# Patient Record
Sex: MALE | Race: WHITE | NOT HISPANIC OR LATINO | Employment: OTHER | URBAN - METROPOLITAN AREA
[De-identification: names, ages, dates, MRNs, and addresses within clinical notes are randomized per-mention and may not be internally consistent; named-entity substitution may affect disease eponyms.]

---

## 2017-12-15 ENCOUNTER — TRANSFERRED RECORDS (OUTPATIENT)
Dept: HEALTH INFORMATION MANAGEMENT | Facility: CLINIC | Age: 57
End: 2017-12-15

## 2018-01-10 DIAGNOSIS — H53.10 SUBJECTIVE VISION DISTURBANCE: Primary | ICD-10-CM

## 2023-06-30 ENCOUNTER — TRANSCRIBE ORDERS (OUTPATIENT)
Dept: OTHER | Age: 63
End: 2023-06-30

## 2023-06-30 DIAGNOSIS — F64.0 GENDER DYSPHORIA IN ADULT: Primary | ICD-10-CM

## 2023-07-10 ENCOUNTER — TELEPHONE (OUTPATIENT)
Dept: PLASTIC SURGERY | Facility: CLINIC | Age: 63
End: 2023-07-10
Payer: COMMERCIAL

## 2023-07-10 NOTE — CONFIDENTIAL NOTE
[facility] :  Care Coordination Note     SITUATION   Fabricio Buchanan (she/her) is a 62 year old adult who is receiving support for:  Care Team  .    BACKGROUND     Pt is scheduled for a full depth vaginoplasty consult with Cynthia Tavarez on 9/15/23.     Writer discussed LOS and hair removal.     ASSESSMENT     Surgery              CGC Assessment  Com[facility] Gender Care (CGC) Enrollment: Enrolled  Patient has a therapist: No  Letter of support #1: Requested  Letter of support #2: Requested  Surgery being considered: Yes  Vaginoplasty: Yes    Pt reports:   Nicotine and working to quit. Pt verbalized understanding to 4 weeks free of nicotine for surgery.   HRT 6 years  No other gender affirming surgeries at this time but scheduled for FFS consult at [facility] in November.       PLAN          Nursing Interventions:       Follow-up plan:  1. Establish with MH provider.   2. Start hair removal.        Anna Betancur

## 2023-07-13 NOTE — TELEPHONE ENCOUNTER
FUTURE VISIT INFORMATION      FUTURE VISIT INFORMATION:    Date: 9/15/23    Time: 10:00am    Location: [facility] [place]  REFERRAL INFORMATION:    Reason for visit/diagnosis  full depth vaginoplasty consult     RECORDS REQUESTED FROM:       No recs to collect

## 2023-08-27 ENCOUNTER — HEALTH MAINTENANCE LETTER (OUTPATIENT)
Age: 63
End: 2023-08-27

## 2023-09-11 ENCOUNTER — TELEPHONE (OUTPATIENT)
Dept: PLASTIC SURGERY | Facility: CLINIC | Age: 63
End: 2023-09-11
Payer: COMMERCIAL

## 2023-09-11 NOTE — TELEPHONE ENCOUNTER
Health Call Center    Phone Message    May a detailed message be left on voicemail: yes     Reason for Call: Other: Pt called and said he has been recently diagnosed with a brain tumor, she stated she is unsure if she should keep the appt. Wondering if this reason would be a reason not to move forward with any surgeries. Please call pt      Action Taken: Message routed to:  [facility] ([facility] surgery     Travel Screening: Not Applicable

## 2023-09-11 NOTE — TELEPHONE ENCOUNTER
Called pt to follow up and answer questions about whether or not she should keep her vaginoplasty consult appointment later this week. Pt explained that she was recently diagnosed with a brain tumor and has care established for follow-up care. Explained to pt that if she wants to reschedule the vaginoplasty consult, she is certainly welcome to do so. Added that there is no reason why we would cancel this appointment, and that the purpose is provide information about surgical offerings in order for pt to make the best decisions for herself. She thanked writer for calling and will plan to keep her appointment this week.

## 2023-09-15 ENCOUNTER — PRE VISIT (OUTPATIENT)
Dept: PLASTIC SURGERY | Facility: CLINIC | Age: 63
End: 2023-09-15

## 2023-09-22 ENCOUNTER — TRANSCRIBE ORDERS (OUTPATIENT)
Dept: OTHER | Age: 63
End: 2023-09-22

## 2023-09-22 ENCOUNTER — TELEPHONE (OUTPATIENT)
Dept: OPHTHALMOLOGY | Facility: CLINIC | Age: 63
End: 2023-09-22
Payer: COMMERCIAL

## 2023-09-22 DIAGNOSIS — H47.10 OPTIC NERVE EDEMA: Primary | ICD-10-CM

## 2023-09-22 NOTE — TELEPHONE ENCOUNTER
This encounter is being sent to inform the clinic that this patient has a referral from Referring provider Dr Reece So,  [facility]# [phone] for the diagnoses of DX: optic nerve Edema of left eye   Referral is being sent on 9/22/23 and has requested that this patient be seen within Priority referral within 1 week and/or with Dr Perez or Dr Juana meier   .  Based on the availability of our provider(s), we are unable to accommodate this request.    Were all sites offered this patient?  Yes    Does scheduling algorithm request to schedule next available?  Patient has been scheduled for the first available opening with Dr ESTELA Perez on 10/18/23.  We have informed the patient that the clinic will review their referral and reach out if a sooner appointment is medically necessary.

## 2023-09-22 NOTE — TELEPHONE ENCOUNTER
Per Dr. Perez    The only way that we would schedule this patient sooner is if we can see the records?  Are they able to fax and can you send them to me via email     Called and left a voicemail     Jennifer [facility] [phone]     Requested records per Dr. Perez's request     Provided a call back number     Carmella Burdick Communication Facilitator on 9/22/2023 at 2:57 PM

## 2023-09-22 NOTE — TELEPHONE ENCOUNTER
[facility] notes are now in the pts chart - can you pls review - pt needs to be seen in a week - thanks -LUIZ Burdick Communication Facilitator on 9/22/2023 at 3:27 PM

## 2023-09-25 ENCOUNTER — TELEPHONE (OUTPATIENT)
Dept: OPHTHALMOLOGY | Facility: CLINIC | Age: 63
End: 2023-09-25
Payer: COMMERCIAL

## 2023-09-25 NOTE — TELEPHONE ENCOUNTER
FUTURE VISIT INFORMATION      FUTURE VISIT INFORMATION:  Date: 10/18/23  Time: 8:30am  [facility]  REFERRAL INFORMATION:  Referring provider:  Reece So, OD   Referring providers clinic:  [facility]  Reason for visit/diagnosis  optic nerve Edema of left eye     RECORDS REQUESTED FROM:       Clinic name Comments Records Status Imaging Status   [facility] Ov/referral 9/22/23  Ov 4/2/21, 12/15/17, 5/14/13, 3/12/13 EPIC    Imaging MR Brain 8/8/23  MR Brain 7/2/23  MR head 12/18/20- [facility]- requested images be pushed

## 2023-09-25 NOTE — TELEPHONE ENCOUNTER
Called and lvm     Unfortunately we have no sooner appointments - appointment is on the waitlist     Carmella Burdick Communication Facilitator on 9/25/2023 at 9:55 AM

## 2023-09-25 NOTE — PROGRESS NOTES
Marcus Buchanan is a 62 year old adult with the following diagnoses:   1. Pituitary tumor    2. Pseudopapilledema of left optic disc    3. Optic nerve edema    4. Visual field defect         Patient was sent for consultation by Dr. So for left optic nerve edema in setting of newly diagnose pituitary tumor.     HPI:    6 months ago patient started noticing oscillopsia but when someone looked at her eyes they weren't moving. It has happening more frequently it was previously once monthly and now it happens daily multiple times a day. Doesn't know of any triggers that cause the oscillopsia. She does sometimes get a sensation on the left side of her head and periocular region. She went to see here primary care doctor who ordered MRI which showed a pituitary gland tumor that per patient is secreting prolactin and they haven't started taking anything to decrease the prolactin levels. Denies any milky discharge from her nipples. Patient has history of remote episodes of severe migraines in  that was being trigger  by the streen at work but usually isn't a headache sufferer. She has had two headaches within the last couple weeks which was resolved with motrin and rest. Denies any decreased vision, double vision, and eye pain. Patient does not have NSGY appointment and hasn't seen one yet. She does have an appointment with endocrinology next month.       Independent historians:  Patient    Review of outside testin23 MRI Brain WWO:      23 MRI Brain WWO:   FINDINGS:   No acute infarct or abnormal foci of restricted diffusion. No mass effect, midline shift, herniation or hydrocephalus. Normal marrow signal.             My interpretation performed today of outside testing:  I have independently reviewed MRI Brain performed 23.         Review of outside clinical notes:  1. Optic nerve edema  Refer to neuro-ophthalmology within the week    2. Pituitary microadenoma (HC)  See above  Sent note to  PCP (Latosha) letter her know results of exam & plan    3. Age-related nuclear cataract of both eyes  Monitor 1 yr exam    4. Hyperopia of both eyes with astigmatism and presbyopia  Fine to cont with OTC readers prn for near activities    9/22/23 -- Visit with Dr. So    Past medical history:    Patient Active Problem List   Diagnosis   (none) - all problems resolved or deleted   Hernia in 2008 s/p repair   Right Rotator cuff tear     Medications:   CHANTIX PO  Motrin   oxyCODONE-acetaminophen  Estradiol   Spironolactone       Family history / social history:  Patient's dad emphysema and HTN, mom lung cancer, sister melanoma, pulmonary fibrosis     Patient  reports that she does not drink alcohol and does not use drugs. Smoker 1/2ppd       Exam:  VA 20/20 OD, 20/20 OS. Pupils no rAPD. Anterior segment wnl for age. Posterior segment significant for superior elevation of ONH with surrounding PPA OS only, no signs of edema each eye. No nystagmus on exam . Strabismus N/A.       Tests ordered and interpreted today:  OCT RNFL:  wnl for age group OD, temporal pallor OS but no ganglion cell loss each eye. Drusen seen OS only.   VF: non specific defects, full OD, superior defects and inferonasal defect OS       Discussion of management / interpretation with another provider:   None    Assessment/Plan:   It is my impression that patient has a pituitary tumor without compression of the optic nerve.  The visual field does not show a bitemporal hemianopia.  The patient has an elevated optic nerve LEFT eye. The TRISTAN-OCT shows optic disc drusen LEFT eye.  The patient has pseudopapilledema LEFT eye.  A handout on optic disc drusen was given.  The patient should be followed annually to watch the optic disc drusen and the pituitary adenoma.               Robert Herrera MD   Fellow, Neuro-Ophthalmology         Attending Physician Attestation:  Complete documentation of historical and exam elements from today's encounter can be  found in the full encounter summary report (not reduplicated in this progress note).  I personally obtained the chief complaint(s) and history of present illness.  I confirmed and edited as necessary the review of systems, past medical/surgical history, family history, social history, and examination findings as documented by others; and I examined the patient myself.  I personally reviewed the relevant tests, images, and reports as documented above.  I formulated and edited as necessary the assessment and plan and discussed the findings and management plan with the patient and family. I personally reviewed the ophthalmic test(s) associated with this encounter, agree with the interpretation(s) as documented by the resident/fellow, and have edited the corresponding report(s) as necessary.  - Eliu Perez MD

## 2023-09-28 ENCOUNTER — OFFICE VISIT (OUTPATIENT)
Dept: OPHTHALMOLOGY | Facility: CLINIC | Age: 63
End: 2023-09-28
Attending: OPHTHALMOLOGY
Payer: COMMERCIAL

## 2023-09-28 DIAGNOSIS — H53.40 VISUAL FIELD DEFECT: ICD-10-CM

## 2023-09-28 DIAGNOSIS — D49.7 PITUITARY TUMOR: Primary | ICD-10-CM

## 2023-09-28 DIAGNOSIS — H53.40 VISUAL FIELD DEFECT: Primary | ICD-10-CM

## 2023-09-28 DIAGNOSIS — H47.332 PSEUDOPAPILLEDEMA OF LEFT OPTIC DISC: ICD-10-CM

## 2023-09-28 DIAGNOSIS — H47.333 PSEUDOPAPILLEDEMA, BILATERAL: ICD-10-CM

## 2023-09-28 PROCEDURE — 92083 EXTENDED VISUAL FIELD XM: CPT | Performed by: OPHTHALMOLOGY

## 2023-09-28 PROCEDURE — 99204 OFFICE O/P NEW MOD 45 MIN: CPT | Mod: GC | Performed by: OPHTHALMOLOGY

## 2023-09-28 PROCEDURE — G0463 HOSPITAL OUTPT CLINIC VISIT: HCPCS | Performed by: OPHTHALMOLOGY

## 2023-09-28 PROCEDURE — 92133 CPTRZD OPH DX IMG PST SGM ON: CPT | Performed by: OPHTHALMOLOGY

## 2023-09-28 RX ORDER — OXYCODONE HYDROCHLORIDE 15 MG/1
TABLET ORAL
COMMUNITY

## 2023-09-28 RX ORDER — ESTRADIOL 2 MG/1
TABLET ORAL
COMMUNITY
Start: 2023-06-02

## 2023-09-28 RX ORDER — SPIRONOLACTONE 100 MG/1
100 TABLET, FILM COATED ORAL
COMMUNITY
Start: 2023-06-02

## 2023-09-28 ASSESSMENT — VISUAL ACUITY
OD_SC: 20/20
METHOD: SNELLEN - LINEAR
OS_SC: 20/20

## 2023-09-28 ASSESSMENT — CONF VISUAL FIELD
OD_NORMAL: 1
OS_INFERIOR_NASAL_RESTRICTION: 0
OD_INFERIOR_NASAL_RESTRICTION: 0
OD_SUPERIOR_TEMPORAL_RESTRICTION: 0
OS_INFERIOR_TEMPORAL_RESTRICTION: 0
METHOD: COUNTING FINGERS
OD_SUPERIOR_NASAL_RESTRICTION: 0
OS_NORMAL: 1
OS_SUPERIOR_NASAL_RESTRICTION: 0
OS_SUPERIOR_TEMPORAL_RESTRICTION: 0
OD_INFERIOR_TEMPORAL_RESTRICTION: 0

## 2023-09-28 ASSESSMENT — TONOMETRY
OD_IOP_MMHG: 20
OS_IOP_MMHG: 17
IOP_METHOD: ICARE

## 2023-09-28 ASSESSMENT — SLIT LAMP EXAM - LIDS
COMMENTS: NORMAL
COMMENTS: NORMAL

## 2023-09-28 ASSESSMENT — CUP TO DISC RATIO
OD_RATIO: 0.1
OS_RATIO: 0.1

## 2023-09-28 ASSESSMENT — EXTERNAL EXAM - LEFT EYE: OS_EXAM: NORMAL

## 2023-09-28 ASSESSMENT — EXTERNAL EXAM - RIGHT EYE: OD_EXAM: NORMAL

## 2023-09-28 NOTE — LETTER
2023         RE:  :  MRN: Marcus Buchanan  1960  [id number]     Dear Dr. So,    Thank you for asking me to see your very pleasant patient, Marcus Buchanan, in neuro-ophthalmic consultation.  I would like to thank you for sending your records and I have summarized them in the history of present illness.  My assessment and plan are below.  For further details, please see my attached clinic note.      Marcus Buchanan is a 62 year old adult with the following diagnoses:   1. Pituitary tumor    2. Pseudopapilledema of left optic disc    3. Optic nerve edema    4. Visual field defect       Patient was sent for consultation by Dr. So for left optic nerve edema in setting of newly diagnose pituitary tumor.     HPI: 6 months ago patient started noticing oscillopsia but when someone looked at her eyes they weren't moving. It has happening more frequently it was previously once monthly and now it happens daily multiple times a day. Doesn't know of any triggers that cause the oscillopsia. She does sometimes get a sensation on the left side of her head and periocular region. She went to see here primary care doctor who ordered MRI which showed a pituitary gland tumor that per patient is secreting prolactin and they haven't started taking anything to decrease the prolactin levels. Denies any milky discharge from her nipples. Patient has history of remote episodes of severe migraines in  that was being trigger  by the streen at work but usually isn't a headache sufferer. She has had two headaches within the last couple weeks which was resolved with motrin and rest. Denies any decreased vision, double vision, and eye pain. Patient does not have NSGY appointment and hasn't seen one yet. She does have an appointment with endocrinology next month.     Independent historians:   Patient    Review of outside testin23 MRI Brain WWO:      23 MRI Brain WWO:   FINDINGS:   No acute infarct or  abnormal foci of restricted diffusion. No mass effect, midline shift, herniation or hydrocephalus. Normal marrow signal.             My interpretation performed today of outside testing: I have independently reviewed MRI Brain performed 8/8/23.     Review of outside clinical notes:  1. Optic nerve edema  Refer to neuro-ophthalmology within the week    2. Pituitary microadenoma (HC)  See above  Sent note to PCP (Latosha) letter her know results of exam & plan    3. Age-related nuclear cataract of both eyes  Monitor 1 yr exam    4. Hyperopia of both eyes with astigmatism and presbyopia  Fine to cont with OTC readers prn for near activities    9/22/23 -- Visit with Dr. So    Past medical history:    Patient Active Problem List   Diagnosis   (none) - all problems resolved or deleted   Hernia in 2008 s/p repair   Right Rotator cuff tear     Medications:   CHANTIX PO  Motrin   oxyCODONE-acetaminophen  Estradiol   Spironolactone     Family history / social history: Patient's dad emphysema and HTN, mom lung cancer, sister melanoma, pulmonary fibrosis     Patient  reports that she does not drink alcohol and does not use drugs. Smoker 1/2ppd     Exam: VA 20/20 OD, 20/20 OS. Pupils no rAPD. Anterior segment wnl for age. Posterior segment significant for superior elevation of ONH with surrounding PPA OS only, no signs of edema each eye. No nystagmus on exam . Strabismus N/A.     Tests ordered and interpreted today: OCT RNFL:  wnl for age group OD, temporal pallor OS but no ganglion cell loss each eye. Drusen seen OS only.   VF: non specific defects, full OD, superior defects and inferonasal defect OS     Discussion of management / interpretation with another provider: None    Assessment/Plan: It is my impression that patient has a pituitary tumor without compression of the optic nerve.  The visual field does not show a bitemporal hemianopia.  The patient has an elevated optic nerve LEFT eye. The TRISTAN-OCT shows optic disc  drusen LEFT eye.  The patient has pseudopapilledema LEFT eye.  A handout on optic disc drusen was given.  The patient should be followed annually to watch the optic disc drusen and the pituitary adenoma.        Again, thank you for allowing me to participate in the care of your patient.      Sincerely,    Eliu Perez MD  Professor  Ophthalmology Residency   Director of Neuro-Ophthalmology  [employer] [facility] [employer] Endowed Chair  Departments of Ophthalmology, Neurology, and Neurosurgery  [address]  [address]  T - [phone]  F - [phone]  OLGA [email or web address]      CC: Reece So, OD  [address] [facility] [address]  Via Fax: [phone]    DX = optic disc drusen, TRISTAN-OCT

## 2023-09-28 NOTE — NURSING NOTE
Chief Complaint(s) and History of Present Illness(es)       New Patient    Since onset it is stable.  Associated symptoms include Negative for double vision, eye pain and headache.  Pain was noted as 0/10.             Comments    Marcus Buchanan is a 62 year old female sent for consultation by Dr. So for optic nerve edema.    Fabricio reports episodes of eye and vision jerky intermittently (lasting about a minute each time) - could make this go away by closing an eye.  Lately, this has not stopped or changed whether or not she closes an eye.  Jerky eyes and vision can occur randomly - occurs at least 5-20 times per week and happening more frequently.  Pt with hx of pituitary microadenoma.  Has an appt to see endocrinologist 10/11/23.  Has been having more headaches.  ROSALBA Islas 9/28/2023 12:15 PM

## 2023-10-18 ENCOUNTER — PRE VISIT (OUTPATIENT)
Dept: OPHTHALMOLOGY | Facility: CLINIC | Age: 63
End: 2023-10-18

## 2024-04-07 ENCOUNTER — APPOINTMENT (OUTPATIENT)
Dept: CT IMAGING | Facility: CLINIC | Age: 64
End: 2024-04-07
Attending: EMERGENCY MEDICINE
Payer: COMMERCIAL

## 2024-04-07 ENCOUNTER — HOSPITAL ENCOUNTER (EMERGENCY)
Facility: CLINIC | Age: 64
Discharge: HOME | End: 2024-04-07
Attending: EMERGENCY MEDICINE | Admitting: EMERGENCY MEDICINE
Payer: COMMERCIAL

## 2024-04-07 VITALS
HEART RATE: 70 BPM | WEIGHT: 239.42 LBS | BODY MASS INDEX: 38.48 KG/M2 | DIASTOLIC BLOOD PRESSURE: 76 MMHG | SYSTOLIC BLOOD PRESSURE: 123 MMHG | OXYGEN SATURATION: 97 % | TEMPERATURE: 98 F | HEIGHT: 66 IN | RESPIRATION RATE: 16 BRPM

## 2024-04-07 DIAGNOSIS — N20.0 RIGHT KIDNEY STONE: ICD-10-CM

## 2024-04-07 LAB
ALBUMIN SERPL BCG-MCNC: 4.2 G/DL (ref 3.5–5.2)
ALBUMIN UR-MCNC: 10 MG/DL
ALP SERPL-CCNC: 122 U/L (ref 40–150)
ALT SERPL W P-5'-P-CCNC: 20 U/L (ref 0–70)
ANION GAP SERPL CALCULATED.3IONS-SCNC: 14 MMOL/L (ref 7–15)
APPEARANCE UR: CLEAR
AST SERPL W P-5'-P-CCNC: 22 U/L (ref 0–45)
BACTERIA #/AREA URNS HPF: ABNORMAL /HPF
BASOPHILS # BLD AUTO: 0.1 10E3/UL (ref 0–0.2)
BASOPHILS NFR BLD AUTO: 0 %
BILIRUB SERPL-MCNC: 0.5 MG/DL
BILIRUB UR QL STRIP: NEGATIVE
BUN SERPL-MCNC: 13.5 MG/DL (ref 8–23)
CALCIUM SERPL-MCNC: 9.2 MG/DL (ref 8.8–10.2)
CHLORIDE SERPL-SCNC: 103 MMOL/L (ref 98–107)
COLOR UR AUTO: ABNORMAL
CREAT SERPL-MCNC: 1.34 MG/DL (ref 0.51–1.17)
DEPRECATED HCO3 PLAS-SCNC: 19 MMOL/L (ref 22–29)
EGFRCR SERPLBLD CKD-EPI 2021: 60 ML/MIN/1.73M2
EOSINOPHIL # BLD AUTO: 0.1 10E3/UL (ref 0–0.7)
EOSINOPHIL NFR BLD AUTO: 0 %
ERYTHROCYTE [DISTWIDTH] IN BLOOD BY AUTOMATED COUNT: 13 % (ref 10–15)
GLUCOSE SERPL-MCNC: 101 MG/DL (ref 70–99)
GLUCOSE UR STRIP-MCNC: NEGATIVE MG/DL
HCT VFR BLD AUTO: 49.8 % (ref 35–53)
HGB BLD-MCNC: 17.4 G/DL (ref 13.3–17.7)
HGB UR QL STRIP: ABNORMAL
IMM GRANULOCYTES # BLD: 0.1 10E3/UL
IMM GRANULOCYTES NFR BLD: 1 %
KETONES UR STRIP-MCNC: NEGATIVE MG/DL
LEUKOCYTE ESTERASE UR QL STRIP: NEGATIVE
LIPASE SERPL-CCNC: 35 U/L (ref 13–60)
LYMPHOCYTES # BLD AUTO: 1.3 10E3/UL (ref 0.8–5.3)
LYMPHOCYTES NFR BLD AUTO: 6 %
MCH RBC QN AUTO: 31.2 PG (ref 26.5–33)
MCHC RBC AUTO-ENTMCNC: 34.9 G/DL (ref 31.5–36.5)
MCV RBC AUTO: 89 FL (ref 78–100)
MONOCYTES # BLD AUTO: 1.3 10E3/UL (ref 0–1.3)
MONOCYTES NFR BLD AUTO: 6 %
MUCOUS THREADS #/AREA URNS LPF: PRESENT /LPF
NEUTROPHILS # BLD AUTO: 19.7 10E3/UL (ref 1.6–8.3)
NEUTROPHILS NFR BLD AUTO: 87 %
NITRATE UR QL: NEGATIVE
NRBC # BLD AUTO: 0 10E3/UL
NRBC BLD AUTO-RTO: 0 /100
PH UR STRIP: 5.5 [PH] (ref 5–7)
PLATELET # BLD AUTO: 222 10E3/UL (ref 150–450)
POTASSIUM SERPL-SCNC: 4.7 MMOL/L (ref 3.4–5.3)
PROT SERPL-MCNC: 7.2 G/DL (ref 6.4–8.3)
RBC # BLD AUTO: 5.58 10E6/UL (ref 3.8–5.9)
RBC URINE: 29 /HPF
SODIUM SERPL-SCNC: 136 MMOL/L (ref 135–145)
SP GR UR STRIP: 1.01 (ref 1–1.03)
SQUAMOUS EPITHELIAL: 8 /HPF
UROBILINOGEN UR STRIP-MCNC: NORMAL MG/DL
WBC # BLD AUTO: 22.5 10E3/UL (ref 4–11)
WBC URINE: 2 /HPF

## 2024-04-07 PROCEDURE — 81003 URINALYSIS AUTO W/O SCOPE: CPT | Performed by: EMERGENCY MEDICINE

## 2024-04-07 PROCEDURE — 83690 ASSAY OF LIPASE: CPT | Performed by: EMERGENCY MEDICINE

## 2024-04-07 PROCEDURE — 96375 TX/PRO/DX INJ NEW DRUG ADDON: CPT

## 2024-04-07 PROCEDURE — 96374 THER/PROPH/DIAG INJ IV PUSH: CPT

## 2024-04-07 PROCEDURE — 36415 COLL VENOUS BLD VENIPUNCTURE: CPT | Performed by: EMERGENCY MEDICINE

## 2024-04-07 PROCEDURE — 85025 COMPLETE CBC W/AUTO DIFF WBC: CPT | Performed by: EMERGENCY MEDICINE

## 2024-04-07 PROCEDURE — 258N000003 HC RX IP 258 OP 636: Performed by: EMERGENCY MEDICINE

## 2024-04-07 PROCEDURE — 74176 CT ABD & PELVIS W/O CONTRAST: CPT

## 2024-04-07 PROCEDURE — 96376 TX/PRO/DX INJ SAME DRUG ADON: CPT

## 2024-04-07 PROCEDURE — 250N000013 HC RX MED GY IP 250 OP 250 PS 637: Performed by: EMERGENCY MEDICINE

## 2024-04-07 PROCEDURE — 99285 EMERGENCY DEPT VISIT HI MDM: CPT | Mod: 25

## 2024-04-07 PROCEDURE — 250N000011 HC RX IP 250 OP 636: Performed by: EMERGENCY MEDICINE

## 2024-04-07 PROCEDURE — 96361 HYDRATE IV INFUSION ADD-ON: CPT

## 2024-04-07 PROCEDURE — 80053 COMPREHEN METABOLIC PANEL: CPT | Performed by: EMERGENCY MEDICINE

## 2024-04-07 RX ORDER — HYDROCODONE BITARTRATE AND ACETAMINOPHEN 5; 325 MG/1; MG/1
1 TABLET ORAL EVERY 6 HOURS PRN
Qty: 10 TABLET | Refills: 0 | Status: SHIPPED | OUTPATIENT
Start: 2024-04-07

## 2024-04-07 RX ORDER — TAMSULOSIN HYDROCHLORIDE 0.4 MG/1
0.4 CAPSULE ORAL DAILY
Qty: 7 CAPSULE | Refills: 0 | Status: SHIPPED | OUTPATIENT
Start: 2024-04-07

## 2024-04-07 RX ORDER — KETOROLAC TROMETHAMINE 15 MG/ML
10 INJECTION, SOLUTION INTRAMUSCULAR; INTRAVENOUS ONCE
Status: COMPLETED | OUTPATIENT
Start: 2024-04-07 | End: 2024-04-07

## 2024-04-07 RX ORDER — TAMSULOSIN HYDROCHLORIDE 0.4 MG/1
0.4 CAPSULE ORAL ONCE
Status: COMPLETED | OUTPATIENT
Start: 2024-04-07 | End: 2024-04-07

## 2024-04-07 RX ORDER — ONDANSETRON 2 MG/ML
4 INJECTION INTRAMUSCULAR; INTRAVENOUS EVERY 30 MIN PRN
Status: DISCONTINUED | OUTPATIENT
Start: 2024-04-07 | End: 2024-04-07 | Stop reason: HOSPADM

## 2024-04-07 RX ADMIN — ONDANSETRON 4 MG: 2 INJECTION INTRAMUSCULAR; INTRAVENOUS at 20:13

## 2024-04-07 RX ADMIN — ONDANSETRON 4 MG: 2 INJECTION INTRAMUSCULAR; INTRAVENOUS at 18:38

## 2024-04-07 RX ADMIN — KETOROLAC TROMETHAMINE 10 MG: 15 INJECTION, SOLUTION INTRAMUSCULAR; INTRAVENOUS at 20:13

## 2024-04-07 RX ADMIN — TAMSULOSIN HYDROCHLORIDE 0.4 MG: 0.4 CAPSULE ORAL at 20:13

## 2024-04-07 RX ADMIN — SODIUM CHLORIDE 1000 ML: 9 INJECTION, SOLUTION INTRAVENOUS at 18:36

## 2024-04-07 ASSESSMENT — COLUMBIA-SUICIDE SEVERITY RATING SCALE - C-SSRS
1. IN THE PAST MONTH, HAVE YOU WISHED YOU WERE DEAD OR WISHED YOU COULD GO TO SLEEP AND NOT WAKE UP?: NO
6. HAVE YOU EVER DONE ANYTHING, STARTED TO DO ANYTHING, OR PREPARED TO DO ANYTHING TO END YOUR LIFE?: NO
2. HAVE YOU ACTUALLY HAD ANY THOUGHTS OF KILLING YOURSELF IN THE PAST MONTH?: NO

## 2024-04-07 ASSESSMENT — ACTIVITIES OF DAILY LIVING (ADL)
ADLS_ACUITY_SCORE: 35
ADLS_ACUITY_SCORE: 35

## 2024-04-07 NOTE — ED TRIAGE NOTES
Pt comes in with right sided flank pain that started this morning at 10am.  Pt states that the pain has gotten so bad that there has been dry heaving.  Pt states that there is no history of kidney stones but that urinating has been an issue today.     Triage Assessment (Adult)       Row Name 04/07/24 7389          Triage Assessment    Airway WDL WDL        Respiratory WDL    Respiratory WDL WDL        Cardiac WDL    Cardiac WDL WDL

## 2024-04-07 NOTE — ED PROVIDER NOTES
History     Chief Complaint:  Flank Pain     The history is provided by the patient.      Marcus Buchanan is a 63 year old adult with history of hypertension and hyperlipidemia who presents to the ED with a relative for evaluation of flank pain. The patient reports that between 0900 and 100 this morning, they developed a sharp and constant right flank pain. The pain has gotten gradually worse and the patient has been unable to relax due to the pain. Patient states the pain ranges from under his ribs down to their hip. Patient endorses dry heaving and emesis after drinking water. Denies hematuria. Patient has had pain like this before but it was not as severe and it resolved after 2 days. For this previous pain, patient was evaluated but no testing was done. Patient notes that they took 800 mg of motrin last night but has not taken anything today. History of cholecystectomy.    Independent Historian:   None - Patient Only      Medications:    Diazepam  Estrace  Roxicodone  Percocet  Aldactone  Albuterol  Valium  Zofran  Hydrochlorothiazide  Prilosec     Past Medical History:    Hypertension  Pituitary microadenoma with hyperprolactinemia  Obesity   Asthma  Panic attacks  Gender dysphoria  Cholecystitis with cholelithiasis  Meralgia paraesthetica   Vitamin D deficiency  Hyperlipidemia   Anxiety  Plantar fasciitis of left foot  Tobacco use disorder   Diverticulitis     Past Surgical History:    Appendectomy   Tonsillectomy   Herniorrhaphy inguinal, left groin exploration, left mesh exploration, left recurrent hernia repair with mesh  Cholecystectomy   Laparoscopic bilateral inguinal hernia repair, lysis of adhesions   Right repair of recurrent inguinal hernia, division ilioinguinal nerve  Repair right incisional hernia, mesh repair umbilical hernia, division right ilioinguinal nerse  Recurrent left inguinal hernia repair  Palat procedure     Physical Exam   Patient Vitals for the past 24 hrs:   BP Temp Temp src Pulse  "Resp SpO2 Height Weight   04/07/24 1804 (!) 161/103 98  F (36.7  C) Temporal 86 18 98 % 1.676 m (5' 6\") 108.6 kg (239 lb 6.7 oz)      Physical Exam  General: appears uncomfortable.   HENT:  Normal nose, oropharynx.  Eyes: EOMI. Normal conjunctiva.  Neck:  Normal range of motion and appearance.   Cardiovascular:  Normal rate.   Pulmonary/Chest:  Effort normal.   Abdominal: Soft. No distension or tenderness.     Musculoskeletal: Normal range of motion.  Neurological: oriented, normal strength, sensation, and coordination.   Skin: Warm and dry. No rash or bruising.   Psychiatric: Normal mood and affect.       Emergency Department Course   Imaging:  CT Abdomen Pelvis w/o Contrast   Final Result   IMPRESSION:    1.  Mild right hydronephrosis secondary to a 5 x 3 mm right UVJ stone.   2.  Mild anterior bladder wall thickening.            Laboratory:  Labs Ordered and Resulted from Time of ED Arrival to Time of ED Departure   COMPREHENSIVE METABOLIC PANEL - Abnormal       Result Value    Sodium 136      Potassium 4.7      Carbon Dioxide (CO2) 19 (*)     Anion Gap 14      Urea Nitrogen 13.5      Creatinine 1.34 (*)     GFR Estimate 60 (*)     Calcium 9.2      Chloride 103      Glucose 101 (*)     Alkaline Phosphatase 122      AST 22      ALT 20      Protein Total 7.2      Albumin 4.2      Bilirubin Total 0.5     ROUTINE UA WITH MICROSCOPIC REFLEX TO CULTURE - Abnormal    Color Urine Light Yellow      Appearance Urine Clear      Glucose Urine Negative      Bilirubin Urine Negative      Ketones Urine Negative      Specific Gravity Urine 1.014      Blood Urine Moderate (*)     pH Urine 5.5      Protein Albumin Urine 10 (*)     Urobilinogen Urine Normal      Nitrite Urine Negative      Leukocyte Esterase Urine Negative      Bacteria Urine Few (*)     Mucus Urine Present (*)     RBC Urine 29 (*)     WBC Urine 2      Squamous Epithelials Urine 8 (*)    CBC WITH PLATELETS AND DIFFERENTIAL - Abnormal    WBC Count 22.5 (*)     RBC " Count 5.58      Hemoglobin 17.4      Hematocrit 49.8      MCV 89      MCH 31.2      MCHC 34.9      RDW 13.0      Platelet Count 222      % Neutrophils 87      % Lymphocytes 6      % Monocytes 6      % Eosinophils 0      % Basophils 0      % Immature Granulocytes 1      NRBCs per 100 WBC 0      Absolute Neutrophils 19.7 (*)     Absolute Lymphocytes 1.3      Absolute Monocytes 1.3      Absolute Eosinophils 0.1      Absolute Basophils 0.1      Absolute Immature Granulocytes 0.1      Absolute NRBCs 0.0     LIPASE - Normal    Lipase 35        Emergency Department Course & Assessments:  Interventions:  Medications   ondansetron (ZOFRAN) injection 4 mg (4 mg Intravenous $Given 24)   sodium chloride 0.9% BOLUS 1,000 mL (0 mLs Intravenous Stopped 24)   ketorolac (TORADOL) injection 10 mg (10 mg Intravenous $Given 24)   tamsulosin (FLOMAX) capsule 0.4 mg (0.4 mg Oral $Given 24)      Assessments/Consultations/Discussion of Management or Tests:  ED Course as of 24   Sun  I obtained history and examined the patient as noted above.     I rechecked and updated the patient.      Social Determinants of Health affecting care:   None    Disposition:  The patient was discharged.     Impression & Plan    CMS Diagnoses: None    MIPS (If applicable):  N/A    Medical Decision Makin-year-old male with acute right flank pain.  CT demonstrates a distal ureteral stone measuring 5 x 3 mm at the ureteral orifice about to enter the bladder.  UA demonstrates hematuria with no signs of infection.  He was given IV fluids, Toradol, and p.o. Flomax with some improvement.  He does have a white blood cell count of 22.5, but there is no concern for complicating infection.  Creatinine is 1.34 with GFR of 60.  He was informed of these findings, I recommended he work to stay well-hydrated and follow-up with clinic to have his renal function test rechecked in the near future.   Follow-up with urology if he has not passed the stone in the next couple days.  He was given prescriptions for Norco and Flomax along with information on kidney stones and opiates.    Diagnosis:    ICD-10-CM    1. Right kidney stone  N20.0          Discharge Medications:  New Prescriptions    HYDROCODONE-ACETAMINOPHEN (NORCO) 5-325 MG TABLET    Take 1 tablet by mouth every 6 hours as needed for pain    TAMSULOSIN (FLOMAX) 0.4 MG CAPSULE    Take 1 capsule (0.4 mg) by mouth daily      Scribe Disclosure:  DAMIEN MADDOX, am serving as a scribe at 6:15 PM on 4/7/2024 to document services personally performed by Rafi Stafford MD based on my observations and the provider's statements to me.     4/7/2024   Rafi Stafford MD Isaacson, Brian A, MD  04/07/24 [id number]

## 2024-04-08 NOTE — DISCHARGE INSTRUCTIONS

## 2024-10-20 ENCOUNTER — HEALTH MAINTENANCE LETTER (OUTPATIENT)
Age: 64
End: 2024-10-20

## 2025-07-10 ENCOUNTER — HOSPITAL ENCOUNTER (EMERGENCY)
Facility: CLINIC | Age: 65
Discharge: HOME | End: 2025-07-10
Attending: EMERGENCY MEDICINE | Admitting: EMERGENCY MEDICINE
Payer: COMMERCIAL

## 2025-07-10 VITALS
HEART RATE: 79 BPM | RESPIRATION RATE: 20 BRPM | WEIGHT: 238.98 LBS | TEMPERATURE: 98.3 F | BODY MASS INDEX: 38.41 KG/M2 | OXYGEN SATURATION: 100 % | SYSTOLIC BLOOD PRESSURE: 150 MMHG | DIASTOLIC BLOOD PRESSURE: 89 MMHG | HEIGHT: 66 IN

## 2025-07-10 DIAGNOSIS — I70.8 LEFT SUBCLAVIAN ARTERY OCCLUSION: ICD-10-CM

## 2025-07-10 PROCEDURE — 99283 EMERGENCY DEPT VISIT LOW MDM: CPT | Performed by: EMERGENCY MEDICINE

## 2025-07-10 ASSESSMENT — COLUMBIA-SUICIDE SEVERITY RATING SCALE - C-SSRS
2. HAVE YOU ACTUALLY HAD ANY THOUGHTS OF KILLING YOURSELF IN THE PAST MONTH?: NO
6. HAVE YOU EVER DONE ANYTHING, STARTED TO DO ANYTHING, OR PREPARED TO DO ANYTHING TO END YOUR LIFE?: NO
1. IN THE PAST MONTH, HAVE YOU WISHED YOU WERE DEAD OR WISHED YOU COULD GO TO SLEEP AND NOT WAKE UP?: NO

## 2025-07-10 ASSESSMENT — ACTIVITIES OF DAILY LIVING (ADL): ADLS_ACUITY_SCORE: 41

## 2025-07-11 NOTE — ED TRIAGE NOTES
Pt arrives from home with a known blood clot in her left arm.  She has had a complete workup but was sent by primary to assess how urgent the surgery is needed,      Triage Assessment (Adult)       Row Name 07/10/25 2045          Triage Assessment    Airway WDL WDL        Respiratory WDL    Respiratory WDL WDL        Skin Circulation/Temperature WDL    Skin Circulation/Temperature WDL WDL        Cardiac WDL    Cardiac WDL WDL        Peripheral/Neurovascular WDL    Peripheral Neurovascular WDL WDL        Cognitive/Neuro/Behavioral WDL    Cognitive/Neuro/Behavioral WDL WDL

## 2025-07-11 NOTE — ED PROVIDER NOTES
"  Emergency Department Note      History of Present Illness     Chief Complaint   Arm Pain      HPI   Marcus Buchanan is a 64 year old adult with a history of hypertension and hyperlipidemia presenting with arm pain. Fabricio was diagnosed with a subclavian artery occlusion on 05/22/2025. Has been trying to get into vascular surgery since diagnosis with no success (appt scheduled for August 2025). Her primary was concerned about worsening symptoms, so she was recommended to the ED. She endorses episodic weakness of her left upper extremity.  She believes that she has been having symptoms since last November or December.    Independent Historian   None    Review of External Notes   Reviewed ultrasound records from 5/22/2025.  Reviewed PCP visit from 6/25/2025.    Past Medical History     Medical History and Problem List   Depression  Psychosocial stress  Anxiety   Depression   Hyperlipidemia  Hypertension   Asthma  Pituitary microadenoma  Cholecystitis   Chronic abdominal pain   Chronic musculoskeletal pain     Medications   Omeprazole   Varenicline tartrate   Spironolactone   Estradiol   Oxycodone     Surgical History   Appendectomy  Tonsillectomy   Hernia repair     Physical Exam     Patient Vitals for the past 24 hrs:   BP Temp Temp src Pulse Resp SpO2 Height Weight   07/10/25 2242 (!) 150/89 -- -- -- -- -- -- --   07/10/25 2046 (!) 177/92 98.3  F (36.8  C) Temporal 79 20 100 % 1.676 m (5' 6\") 108.4 kg (238 lb 15.7 oz)     Physical Exam  Nursing note and vitals reviewed.  HENT:   Mouth/Throat: Moist mucous membranes.   Eyes: EOMI, nonicteric sclera  Cardiovascular: Normal rate, regular rhythm, no murmur. Bilateral radial pulses noted - R>L.   Pulmonary/Chest: Effort normal and breath sounds normal. No respiratory distress. No wheezes. No rales.   Abdominal: Soft. Nontender, nondistended, no guarding or rigidity.   Musculoskeletal: Normal range of motion.  No difference in color or temperature of bilateral " arms.  Neurological: Alert. Moves all extremities spontaneously.   Sensation intact & equal in median, ulnar, and radial nerve distributions bilaterally.  Pt able to perform 'OK' sign, thumb/little finger opposition, cross finger adduction, finger abduction, 3rd finger extension, thumb flexion/extension.   Weaker  strength on left compared to right.  Skin: Skin is warm and dry. No rash noted.           ED Course      Medications Administered   Medications - No data to display    Procedures   Procedures     Discussion of Management   Vascular Surgery    ED Course   ED Course as of 07/11/25 0239   Thu Jul 10, 2025   2217 I obtained the history and examined the patient as noted above.      2226 I spoke with vascular surgery regarding the patient's presentation, findings here in the ED and plan of care.          Additional Documentation  None    Medical Decision Making / Diagnosis     CMS Diagnoses: None    MIPS   None          MDM   Marcus Buchanan is a 64 year old adult who was referred to ED by her PCP with concerns that patient needed to see vascular surgery sooner than August 26.  Patient was diagnosed with subclavian artery occlusion on May 22.  She had been having symptoms for at least 5 months prior to that.  On exam, radial pulse palpable and temperature/color/swelling equal between both upper extremities.  She reports her left arm is weaker which has been ongoing for several months.  I discussed with on-call vascular surgery who states given ultrasound results from 5/22 as well as my exam today that emergent surgery is not indicated.  I discussed updating imaging via ultrasound with the patient which she declines.  She is interested in trying to get into see [facility] therefore referral was made.  Discussed red flags that would merit ED return including change in color, temperature, or pain to the left upper extremity, fevers, or for any other concerns.  Patient in stable condition at time  of discharge.  All questions answered and she is in agreement with the plan.    Disposition   The patient was discharged.     Diagnosis     ICD-10-CM    1. Left subclavian artery occlusion  I70.8 Vascular Surgery Referral           Discharge Medications   Discharge Medication List as of 7/10/2025 10:38 PM        Scribe Disclosure:  I, Sofy Sid, am serving as a scribe at 10:19 PM on 7/10/2025 to document services personally performed by Keith Daigle MD based on my observations and the provider's statements to me.      Keith Daigle MD  07/11/25 0244

## 2025-07-11 NOTE — DISCHARGE INSTRUCTIONS
We discussed with our on-call vascular surgeon. No need for emergency surgery tonight.     I placed a referral through [facility] to try to get you seen before August 26.    Return to emergency department for cold/blue hand, worsening pain, or for any other concerns.